# Patient Record
Sex: FEMALE | Race: WHITE | Employment: PART TIME | URBAN - METROPOLITAN AREA
[De-identification: names, ages, dates, MRNs, and addresses within clinical notes are randomized per-mention and may not be internally consistent; named-entity substitution may affect disease eponyms.]

---

## 2018-04-04 ENCOUNTER — HOSPITAL ENCOUNTER (OUTPATIENT)
Dept: OTHER | Age: 28
Discharge: HOME | End: 2018-04-04
Payer: OTHER GOVERNMENT

## 2018-04-04 ENCOUNTER — HOSPITAL ENCOUNTER (OUTPATIENT)
Dept: PREADMISSION TESTING | Age: 28
Discharge: HOME | End: 2018-04-04
Payer: OTHER GOVERNMENT

## 2018-04-04 ENCOUNTER — HOSPITAL ENCOUNTER (OUTPATIENT)
Dept: LAB | Age: 28
Discharge: HOME | End: 2018-04-04
Payer: OTHER GOVERNMENT

## 2018-04-04 DIAGNOSIS — Z01.818 PRE-OP TESTING: ICD-10-CM

## 2018-04-04 DIAGNOSIS — Z01.811 PRE-OP CHEST EXAM: ICD-10-CM

## 2018-04-04 LAB
ALBUMIN SERPL-MCNC: 3.6 G/DL (ref 3.4–5)
ALBUMIN/GLOB SERPL: 1.1 {RATIO} (ref 0.8–1.7)
ALP SERPL-CCNC: 112 U/L (ref 45–117)
ALT SERPL-CCNC: 18 U/L (ref 13–56)
ANION GAP SERPL CALC-SCNC: 8 MMOL/L (ref 3–18)
APPEARANCE UR: CLEAR
APTT PPP: 28.7 SEC (ref 23–36.4)
AST SERPL-CCNC: 16 U/L (ref 15–37)
BACTERIA URNS QL MICRO: NEGATIVE /HPF
BASOPHILS # BLD: 0 K/UL (ref 0–0.06)
BASOPHILS NFR BLD: 0 % (ref 0–2)
BILIRUB SERPL-MCNC: 0.2 MG/DL (ref 0.2–1)
BILIRUB UR QL: NEGATIVE
BUN SERPL-MCNC: 7 MG/DL (ref 7–18)
BUN/CREAT SERPL: 8 (ref 12–20)
CALCIUM SERPL-MCNC: 8.7 MG/DL (ref 8.5–10.1)
CHLORIDE SERPL-SCNC: 107 MMOL/L (ref 100–108)
CO2 SERPL-SCNC: 26 MMOL/L (ref 21–32)
COLOR UR: YELLOW
CREAT SERPL-MCNC: 0.85 MG/DL (ref 0.6–1.3)
DIFFERENTIAL METHOD BLD: ABNORMAL
EOSINOPHIL # BLD: 0.3 K/UL (ref 0–0.4)
EOSINOPHIL NFR BLD: 3 % (ref 0–5)
EPITH CASTS URNS QL MICRO: NORMAL /LPF (ref 0–5)
ERYTHROCYTE [DISTWIDTH] IN BLOOD BY AUTOMATED COUNT: 12.3 % (ref 11.6–14.5)
GLOBULIN SER CALC-MCNC: 3.4 G/DL (ref 2–4)
GLUCOSE SERPL-MCNC: 91 MG/DL (ref 74–99)
GLUCOSE UR STRIP.AUTO-MCNC: NEGATIVE MG/DL
HCT VFR BLD AUTO: 40.1 % (ref 35–45)
HGB BLD-MCNC: 13.8 G/DL (ref 12–16)
HGB UR QL STRIP: NEGATIVE
INR PPP: 0.9 (ref 0.8–1.2)
KETONES UR QL STRIP.AUTO: NEGATIVE MG/DL
LEUKOCYTE ESTERASE UR QL STRIP.AUTO: ABNORMAL
LYMPHOCYTES # BLD: 1.8 K/UL (ref 0.9–3.6)
LYMPHOCYTES NFR BLD: 18 % (ref 21–52)
MCH RBC QN AUTO: 30.5 PG (ref 24–34)
MCHC RBC AUTO-ENTMCNC: 34.4 G/DL (ref 31–37)
MCV RBC AUTO: 88.5 FL (ref 74–97)
MONOCYTES # BLD: 0.6 K/UL (ref 0.05–1.2)
MONOCYTES NFR BLD: 6 % (ref 3–10)
NEUTS SEG # BLD: 7.1 K/UL (ref 1.8–8)
NEUTS SEG NFR BLD: 73 % (ref 40–73)
NITRITE UR QL STRIP.AUTO: NEGATIVE
PH UR STRIP: 7.5 [PH] (ref 5–8)
PLATELET # BLD AUTO: 229 K/UL (ref 135–420)
PMV BLD AUTO: 8.9 FL (ref 9.2–11.8)
POTASSIUM SERPL-SCNC: 3.9 MMOL/L (ref 3.5–5.5)
PROT SERPL-MCNC: 7 G/DL (ref 6.4–8.2)
PROT UR STRIP-MCNC: NEGATIVE MG/DL
PROTHROMBIN TIME: 12.1 SEC (ref 11.5–15.2)
RBC # BLD AUTO: 4.53 M/UL (ref 4.2–5.3)
RBC #/AREA URNS HPF: 0 /HPF (ref 0–5)
SODIUM SERPL-SCNC: 141 MMOL/L (ref 136–145)
SP GR UR REFRACTOMETRY: 1.02 (ref 1–1.03)
UROBILINOGEN UR QL STRIP.AUTO: 1 EU/DL (ref 0.2–1)
WBC # BLD AUTO: 9.8 K/UL (ref 4.6–13.2)
WBC URNS QL MICRO: NORMAL /HPF (ref 0–4)

## 2018-04-04 PROCEDURE — 87086 URINE CULTURE/COLONY COUNT: CPT | Performed by: NEUROLOGICAL SURGERY

## 2018-04-04 PROCEDURE — 71046 X-RAY EXAM CHEST 2 VIEWS: CPT

## 2018-04-04 PROCEDURE — 81001 URINALYSIS AUTO W/SCOPE: CPT | Performed by: NEUROLOGICAL SURGERY

## 2018-04-04 PROCEDURE — 36415 COLL VENOUS BLD VENIPUNCTURE: CPT | Performed by: NEUROLOGICAL SURGERY

## 2018-04-04 PROCEDURE — 85610 PROTHROMBIN TIME: CPT | Performed by: NEUROLOGICAL SURGERY

## 2018-04-04 PROCEDURE — 85730 THROMBOPLASTIN TIME PARTIAL: CPT | Performed by: NEUROLOGICAL SURGERY

## 2018-04-04 PROCEDURE — 80053 COMPREHEN METABOLIC PANEL: CPT | Performed by: NEUROLOGICAL SURGERY

## 2018-04-04 PROCEDURE — 85025 COMPLETE CBC W/AUTO DIFF WBC: CPT | Performed by: NEUROLOGICAL SURGERY

## 2018-04-04 PROCEDURE — 93005 ELECTROCARDIOGRAM TRACING: CPT

## 2018-04-04 RX ORDER — BUPROPION HYDROCHLORIDE 150 MG/1
150 TABLET, EXTENDED RELEASE ORAL 2 TIMES DAILY
COMMUNITY

## 2018-04-04 RX ORDER — ETONOGESTREL AND ETHINYL ESTRADIOL 11.7; 2.7 MG/1; MG/1
INSERT, EXTENDED RELEASE VAGINAL
COMMUNITY

## 2018-04-04 NOTE — PERIOP NOTES
PAT - SURGICAL PRE-ADMISSION INSTRUCTIONS    NAME:  Natalio Roblero                                                          TODAY'S DATE:  4/4/2018    SURGERY DATE:  4/12/2018                                  SURGERY ARRIVAL TIME:   1130    1. Do NOT eat or drink anything, including candy or gum, after MIDNIGHT on 4/11/18 , unless you have specific instructions from your Surgeon or Anesthesia Provider to do so. 2. No smoking on the day of surgery. 3. No alcohol 24 hours prior to the day of surgery. 4. No recreational drugs for one week prior to the day of surgery. 5. Leave all valuables, including money/purse, at home. 6. Remove all jewelry, nail polish, makeup (including mascara); no lotions, powders, deodorant, or perfume/cologne/after shave. 7. Glasses/Contact lenses and Dentures may be worn to the hospital.  They will be removed prior to surgery. 8. Call your doctor if symptoms of a cold or illness develop within 24 ours prior to surgery. 9. AN ADULT MUST DRIVE YOU HOME AFTER OUTPATIENT SURGERY. 10. If you are having an OUTPATIENT procedure, please make arrangements for a responsible adult to be with you for 24 hours after your surgery. 11. If you are admitted to the hospital, you will be assigned to a bed after surgery is complete. Normally a family member will not be able to see you until you are in your assigned bed. 15. Family is encouraged to accompany you to the hospital.  We ask visitors in the treatment area to be limited to ONE person at a time to ensure patient privacy. EXCEPTIONS WILL BE MADE AS NEEDED. 15. Children under 12 are discouraged from entering the treatment area and need to be supervised by an adult when in the waiting room. Special Instructions: Take these medications the morning of surgery with a sip of water: Wellbutrin    Patient Prep:    use CHG solution    These surgical instructions were reviewed with patient during the PAT phone call.   A printed copy of the instructions was provided to patient. Directions: On the morning of surgery, please go to the [facility]. Enter the building from the [facility] entrance, 1st floor (next to the Emergency Room entrance). Take the elevator to the 2nd floor. Sign in at the Registration Desk.     If you have any questions and/or concerns, please do not hesitate to call:  (Prior to the day of surgery)  hospitals unit:  [phone]  (Day of surgery)  [facility] unit:  [phone]

## 2018-04-05 LAB
ATRIAL RATE: 74 BPM
CALCULATED P AXIS, ECG09: 60 DEGREES
CALCULATED R AXIS, ECG10: 55 DEGREES
CALCULATED T AXIS, ECG11: 57 DEGREES
DIAGNOSIS, 93000: NORMAL
P-R INTERVAL, ECG05: 158 MS
Q-T INTERVAL, ECG07: 384 MS
QRS DURATION, ECG06: 92 MS
QTC CALCULATION (BEZET), ECG08: 426 MS
VENTRICULAR RATE, ECG03: 74 BPM

## 2018-04-06 LAB
BACTERIA SPEC CULT: NORMAL
SERVICE CMNT-IMP: NORMAL

## 2018-04-11 ENCOUNTER — ANESTHESIA EVENT (OUTPATIENT)
Dept: SURGERY | Age: 28
End: 2018-04-11
Payer: OTHER GOVERNMENT

## 2018-04-11 ENCOUNTER — HOSPITAL ENCOUNTER (OUTPATIENT)
Dept: PREADMISSION TESTING | Age: 28
Discharge: HOME | End: 2018-04-11
Payer: OTHER GOVERNMENT

## 2018-04-11 DIAGNOSIS — Z01.818 PREOP TESTING: ICD-10-CM

## 2018-04-11 LAB
ABO + RH BLD: NORMAL
BLOOD GROUP ANTIBODIES SERPL: NORMAL
SPECIMEN EXP DATE BLD: NORMAL

## 2018-04-11 PROCEDURE — 86900 BLOOD TYPING SEROLOGIC ABO: CPT | Performed by: NEUROLOGICAL SURGERY

## 2018-04-11 PROCEDURE — 36415 COLL VENOUS BLD VENIPUNCTURE: CPT | Performed by: NEUROLOGICAL SURGERY

## 2018-04-12 ENCOUNTER — APPOINTMENT (OUTPATIENT)
Dept: GENERAL RADIOLOGY | Age: 28
End: 2018-04-12
Attending: NEUROLOGICAL SURGERY
Payer: OTHER GOVERNMENT

## 2018-04-12 ENCOUNTER — ANESTHESIA (OUTPATIENT)
Dept: SURGERY | Age: 28
End: 2018-04-12
Payer: OTHER GOVERNMENT

## 2018-04-12 ENCOUNTER — HOSPITAL ENCOUNTER (OUTPATIENT)
Age: 28
Setting detail: OUTPATIENT SURGERY
Discharge: HOME | End: 2018-04-12
Attending: NEUROLOGICAL SURGERY | Admitting: NEUROLOGICAL SURGERY
Payer: OTHER GOVERNMENT

## 2018-04-12 VITALS
DIASTOLIC BLOOD PRESSURE: 72 MMHG | BODY MASS INDEX: 32.95 KG/M2 | RESPIRATION RATE: 14 BRPM | OXYGEN SATURATION: 100 % | HEIGHT: 69 IN | WEIGHT: 222.44 LBS | TEMPERATURE: 97.1 F | SYSTOLIC BLOOD PRESSURE: 117 MMHG | HEART RATE: 74 BPM

## 2018-04-12 DIAGNOSIS — M51.16 LUMBAR DISC DISEASE WITH RADICULOPATHY: Primary | ICD-10-CM

## 2018-04-12 LAB — HCG UR QL: NEGATIVE

## 2018-04-12 PROCEDURE — 74011250636 HC RX REV CODE- 250/636

## 2018-04-12 PROCEDURE — 76060000033 HC ANESTHESIA 1 TO 1.5 HR: Performed by: NEUROLOGICAL SURGERY

## 2018-04-12 PROCEDURE — 74011250636 HC RX REV CODE- 250/636: Performed by: NURSE ANESTHETIST, CERTIFIED REGISTERED

## 2018-04-12 PROCEDURE — 77030008477 HC STYL SATN SLP COVD -A: Performed by: ANESTHESIOLOGY

## 2018-04-12 PROCEDURE — 77030033827 HC SLV COMPR SCD THGH COVD -B: Performed by: NEUROLOGICAL SURGERY

## 2018-04-12 PROCEDURE — 77030013079 HC BLNKT BAIR HGGR 3M -A: Performed by: ANESTHESIOLOGY

## 2018-04-12 PROCEDURE — 74011000250 HC RX REV CODE- 250: Performed by: NEUROLOGICAL SURGERY

## 2018-04-12 PROCEDURE — C1729 CATH, DRAINAGE: HCPCS | Performed by: NEUROLOGICAL SURGERY

## 2018-04-12 PROCEDURE — 74011250636 HC RX REV CODE- 250/636: Performed by: NEUROLOGICAL SURGERY

## 2018-04-12 PROCEDURE — 72100 X-RAY EXAM L-S SPINE 2/3 VWS: CPT

## 2018-04-12 PROCEDURE — 77030029099 HC BN WAX SSPC -A: Performed by: NEUROLOGICAL SURGERY

## 2018-04-12 PROCEDURE — 77030028990 HC ADH TISS DERMFLX CHMP -B: Performed by: NEUROLOGICAL SURGERY

## 2018-04-12 PROCEDURE — 74011000272 HC RX REV CODE- 272: Performed by: NEUROLOGICAL SURGERY

## 2018-04-12 PROCEDURE — 77030008683 HC TU ET CUF COVD -A: Performed by: ANESTHESIOLOGY

## 2018-04-12 PROCEDURE — 77030003028 HC SUT VCRL J&J -A: Performed by: NEUROLOGICAL SURGERY

## 2018-04-12 PROCEDURE — 77030018842 HC SOL IRR SOD CL 9% BAXT -A: Performed by: NEUROLOGICAL SURGERY

## 2018-04-12 PROCEDURE — 77030018673: Performed by: NEUROLOGICAL SURGERY

## 2018-04-12 PROCEDURE — 77030012602 HC SPNG PTTY NEUR J&J -B: Performed by: NEUROLOGICAL SURGERY

## 2018-04-12 PROCEDURE — 76010000149 HC OR TIME 1 TO 1.5 HR: Performed by: NEUROLOGICAL SURGERY

## 2018-04-12 PROCEDURE — 74011250637 HC RX REV CODE- 250/637: Performed by: NURSE ANESTHETIST, CERTIFIED REGISTERED

## 2018-04-12 PROCEDURE — 74011000250 HC RX REV CODE- 250

## 2018-04-12 PROCEDURE — 76210000026 HC REC RM PH II 1 TO 1.5 HR: Performed by: NEUROLOGICAL SURGERY

## 2018-04-12 PROCEDURE — 77030011640 HC PAD GRND REM COVD -A: Performed by: NEUROLOGICAL SURGERY

## 2018-04-12 PROCEDURE — 77030003029 HC SUT VCRL J&J -B: Performed by: NEUROLOGICAL SURGERY

## 2018-04-12 PROCEDURE — 81025 URINE PREGNANCY TEST: CPT

## 2018-04-12 PROCEDURE — 76210000006 HC OR PH I REC 0.5 TO 1 HR: Performed by: NEUROLOGICAL SURGERY

## 2018-04-12 PROCEDURE — 77030011264 HC ELECTRD BLD EXT COVD -A: Performed by: NEUROLOGICAL SURGERY

## 2018-04-12 RX ORDER — DEXAMETHASONE SODIUM PHOSPHATE 4 MG/ML
INJECTION, SOLUTION INTRA-ARTICULAR; INTRALESIONAL; INTRAMUSCULAR; INTRAVENOUS; SOFT TISSUE AS NEEDED
Status: DISCONTINUED | OUTPATIENT
Start: 2018-04-12 | End: 2018-04-12 | Stop reason: HOSPADM

## 2018-04-12 RX ORDER — GLYCOPYRROLATE 0.2 MG/ML
INJECTION INTRAMUSCULAR; INTRAVENOUS AS NEEDED
Status: DISCONTINUED | OUTPATIENT
Start: 2018-04-12 | End: 2018-04-12 | Stop reason: HOSPADM

## 2018-04-12 RX ORDER — ONDANSETRON 2 MG/ML
4 INJECTION INTRAMUSCULAR; INTRAVENOUS
Status: COMPLETED | OUTPATIENT
Start: 2018-04-12 | End: 2018-04-12

## 2018-04-12 RX ORDER — METHYLPREDNISOLONE 4 MG/1
TABLET ORAL
Qty: 1 DOSE PACK | Refills: 0 | Status: SHIPPED | OUTPATIENT
Start: 2018-04-12 | End: 2018-09-28

## 2018-04-12 RX ORDER — MIDAZOLAM HYDROCHLORIDE 1 MG/ML
INJECTION, SOLUTION INTRAMUSCULAR; INTRAVENOUS AS NEEDED
Status: DISCONTINUED | OUTPATIENT
Start: 2018-04-12 | End: 2018-04-12 | Stop reason: HOSPADM

## 2018-04-12 RX ORDER — SODIUM CHLORIDE, SODIUM LACTATE, POTASSIUM CHLORIDE, CALCIUM CHLORIDE 600; 310; 30; 20 MG/100ML; MG/100ML; MG/100ML; MG/100ML
75 INJECTION, SOLUTION INTRAVENOUS CONTINUOUS
Status: DISCONTINUED | OUTPATIENT
Start: 2018-04-12 | End: 2018-04-12 | Stop reason: HOSPADM

## 2018-04-12 RX ORDER — NEOSTIGMINE METHYLSULFATE 5 MG/5 ML
SYRINGE (ML) INTRAVENOUS AS NEEDED
Status: DISCONTINUED | OUTPATIENT
Start: 2018-04-12 | End: 2018-04-12 | Stop reason: HOSPADM

## 2018-04-12 RX ORDER — ONDANSETRON 2 MG/ML
INJECTION INTRAMUSCULAR; INTRAVENOUS AS NEEDED
Status: DISCONTINUED | OUTPATIENT
Start: 2018-04-12 | End: 2018-04-12 | Stop reason: HOSPADM

## 2018-04-12 RX ORDER — LIDOCAINE HYDROCHLORIDE AND EPINEPHRINE 10; 10 MG/ML; UG/ML
INJECTION, SOLUTION INFILTRATION; PERINEURAL AS NEEDED
Status: DISCONTINUED | OUTPATIENT
Start: 2018-04-12 | End: 2018-04-12 | Stop reason: HOSPADM

## 2018-04-12 RX ORDER — VECURONIUM BROMIDE FOR INJECTION 1 MG/ML
INJECTION, POWDER, LYOPHILIZED, FOR SOLUTION INTRAVENOUS AS NEEDED
Status: DISCONTINUED | OUTPATIENT
Start: 2018-04-12 | End: 2018-04-12 | Stop reason: HOSPADM

## 2018-04-12 RX ORDER — LIDOCAINE HYDROCHLORIDE AND EPINEPHRINE 10; 10 MG/ML; UG/ML
30 INJECTION, SOLUTION INFILTRATION; PERINEURAL ONCE
Status: DISCONTINUED | OUTPATIENT
Start: 2018-04-12 | End: 2018-04-12 | Stop reason: HOSPADM

## 2018-04-12 RX ORDER — SODIUM CHLORIDE 0.9 % (FLUSH) 0.9 %
5-10 SYRINGE (ML) INJECTION EVERY 8 HOURS
Status: DISCONTINUED | OUTPATIENT
Start: 2018-04-12 | End: 2018-04-12 | Stop reason: HOSPADM

## 2018-04-12 RX ORDER — SODIUM CHLORIDE 0.9 % (FLUSH) 0.9 %
5-10 SYRINGE (ML) INJECTION AS NEEDED
Status: DISCONTINUED | OUTPATIENT
Start: 2018-04-12 | End: 2018-04-12 | Stop reason: HOSPADM

## 2018-04-12 RX ORDER — OXYCODONE AND ACETAMINOPHEN 5; 325 MG/1; MG/1
1-2 TABLET ORAL
Qty: 40 TAB | Refills: 0 | Status: SHIPPED | OUTPATIENT
Start: 2018-04-12 | End: 2018-09-28

## 2018-04-12 RX ORDER — LIDOCAINE HYDROCHLORIDE 20 MG/ML
INJECTION, SOLUTION EPIDURAL; INFILTRATION; INTRACAUDAL; PERINEURAL AS NEEDED
Status: DISCONTINUED | OUTPATIENT
Start: 2018-04-12 | End: 2018-04-12 | Stop reason: HOSPADM

## 2018-04-12 RX ORDER — PROPOFOL 10 MG/ML
INJECTION, EMULSION INTRAVENOUS AS NEEDED
Status: DISCONTINUED | OUTPATIENT
Start: 2018-04-12 | End: 2018-04-12 | Stop reason: HOSPADM

## 2018-04-12 RX ORDER — MORPHINE SULFATE 4 MG/ML
INJECTION, SOLUTION INTRAMUSCULAR; INTRAVENOUS AS NEEDED
Status: DISCONTINUED | OUTPATIENT
Start: 2018-04-12 | End: 2018-04-12 | Stop reason: HOSPADM

## 2018-04-12 RX ORDER — FAMOTIDINE 20 MG/1
20 TABLET, FILM COATED ORAL ONCE
Status: COMPLETED | OUTPATIENT
Start: 2018-04-13 | End: 2018-04-12

## 2018-04-12 RX ORDER — CEFAZOLIN SODIUM 2 G/50ML
2 SOLUTION INTRAVENOUS
Status: COMPLETED | OUTPATIENT
Start: 2018-04-12 | End: 2018-04-12

## 2018-04-12 RX ORDER — FENTANYL CITRATE 50 UG/ML
INJECTION, SOLUTION INTRAMUSCULAR; INTRAVENOUS AS NEEDED
Status: DISCONTINUED | OUTPATIENT
Start: 2018-04-12 | End: 2018-04-12 | Stop reason: HOSPADM

## 2018-04-12 RX ORDER — FAMOTIDINE 20 MG/1
TABLET, FILM COATED ORAL
Status: DISCONTINUED
Start: 2018-04-12 | End: 2018-04-12 | Stop reason: HOSPADM

## 2018-04-12 RX ORDER — FENTANYL CITRATE 50 UG/ML
50 INJECTION, SOLUTION INTRAMUSCULAR; INTRAVENOUS
Status: DISCONTINUED | OUTPATIENT
Start: 2018-04-12 | End: 2018-04-12 | Stop reason: HOSPADM

## 2018-04-12 RX ORDER — SODIUM CHLORIDE, SODIUM LACTATE, POTASSIUM CHLORIDE, CALCIUM CHLORIDE 600; 310; 30; 20 MG/100ML; MG/100ML; MG/100ML; MG/100ML
50 INJECTION, SOLUTION INTRAVENOUS CONTINUOUS
Status: DISCONTINUED | OUTPATIENT
Start: 2018-04-13 | End: 2018-04-12 | Stop reason: HOSPADM

## 2018-04-12 RX ADMIN — Medication 3 MG: at 18:14

## 2018-04-12 RX ADMIN — SODIUM CHLORIDE, SODIUM LACTATE, POTASSIUM CHLORIDE, AND CALCIUM CHLORIDE 50 ML/HR: 600; 310; 30; 20 INJECTION, SOLUTION INTRAVENOUS at 13:02

## 2018-04-12 RX ADMIN — LIDOCAINE HYDROCHLORIDE 50 MG: 20 INJECTION, SOLUTION EPIDURAL; INFILTRATION; INTRACAUDAL; PERINEURAL at 17:08

## 2018-04-12 RX ADMIN — ONDANSETRON 4 MG: 2 INJECTION INTRAMUSCULAR; INTRAVENOUS at 17:23

## 2018-04-12 RX ADMIN — SODIUM CHLORIDE, SODIUM LACTATE, POTASSIUM CHLORIDE, AND CALCIUM CHLORIDE: 600; 310; 30; 20 INJECTION, SOLUTION INTRAVENOUS at 17:31

## 2018-04-12 RX ADMIN — MIDAZOLAM HYDROCHLORIDE 2 MG: 1 INJECTION, SOLUTION INTRAMUSCULAR; INTRAVENOUS at 17:03

## 2018-04-12 RX ADMIN — FAMOTIDINE 20 MG: 20 TABLET, FILM COATED ORAL at 13:02

## 2018-04-12 RX ADMIN — PROPOFOL 200 MG: 10 INJECTION, EMULSION INTRAVENOUS at 17:08

## 2018-04-12 RX ADMIN — GLYCOPYRROLATE 0.4 MG: 0.2 INJECTION INTRAMUSCULAR; INTRAVENOUS at 18:14

## 2018-04-12 RX ADMIN — MORPHINE SULFATE 7 MG: 4 INJECTION, SOLUTION INTRAMUSCULAR; INTRAVENOUS at 18:22

## 2018-04-12 RX ADMIN — CEFAZOLIN SODIUM 2 G: 2 SOLUTION INTRAVENOUS at 17:23

## 2018-04-12 RX ADMIN — PROPOFOL 50 MG: 10 INJECTION, EMULSION INTRAVENOUS at 17:13

## 2018-04-12 RX ADMIN — FENTANYL CITRATE 100 MCG: 50 INJECTION, SOLUTION INTRAMUSCULAR; INTRAVENOUS at 17:03

## 2018-04-12 RX ADMIN — MORPHINE SULFATE 3 MG: 4 INJECTION, SOLUTION INTRAMUSCULAR; INTRAVENOUS at 17:17

## 2018-04-12 RX ADMIN — DEXAMETHASONE SODIUM PHOSPHATE 10 MG: 4 INJECTION, SOLUTION INTRA-ARTICULAR; INTRALESIONAL; INTRAMUSCULAR; INTRAVENOUS; SOFT TISSUE at 17:18

## 2018-04-12 RX ADMIN — VECURONIUM BROMIDE FOR INJECTION 1 MG: 1 INJECTION, POWDER, LYOPHILIZED, FOR SOLUTION INTRAVENOUS at 17:55

## 2018-04-12 RX ADMIN — VECURONIUM BROMIDE FOR INJECTION 4 MG: 1 INJECTION, POWDER, LYOPHILIZED, FOR SOLUTION INTRAVENOUS at 17:08

## 2018-04-12 RX ADMIN — ONDANSETRON 4 MG: 2 INJECTION INTRAMUSCULAR; INTRAVENOUS at 19:24

## 2018-04-12 NOTE — ANESTHESIA POSTPROCEDURE EVALUATION
Post-Anesthesia Evaluation and Assessment    Patient: Bessy Amor MRN: [id number]  SSN: [id number]    YOB: 1990  Age: 32 y.o. Sex: female       Cardiovascular Function/Vital Signs  Visit Vitals    /57    Pulse 86    Temp 36.2 °C (97.1 °F)    Resp 15    Ht 5' 9\" (1.753 m)    Wt 100.9 kg (222 lb 7 oz)    SpO2 100%    BMI 32.85 kg/m2       Patient is status post general anesthesia for Procedure(s): MINIMALLY INVASIVE right lumbar 5 sacral 1 far lateral microdiscectomy. Nausea/Vomiting: None    Postoperative hydration reviewed and adequate. Pain:  Pain Scale 1: Visual (04/12/18 1832)  Pain Intensity 1: 0 (04/12/18 1832)   Managed    Neurological Status:   Neuro (WDL): Within Defined Limits (04/12/18 1249)   At baseline    Mental Status and Level of Consciousness: Arousable    Pulmonary Status:   O2 Device: Room air (04/12/18 1837)   Adequate oxygenation and airway patent    Complications related to anesthesia: None    Post-anesthesia assessment completed.  No concerns    Signed By: Mouniak Mann MD     April 12, 2018

## 2018-04-12 NOTE — PERIOP NOTES
1832  Patient received in PACU and connected to monitors. Vital signs stable. RN at bedside. Will continue to monitor. 1910  Spoke to patient's family via phone in waiting area re status update and plan of care.

## 2018-04-12 NOTE — IP AVS SNAPSHOT
[address] [address] Ynes Farrell  
[phone] Patient: Lis Shirley MRN: [id number] VFI:[id number] About your hospitalization You were admitted on:  April 12, 2018 You last received care in the:  [facility] PHASE 2 RECOVERY You were discharged on:  April 12, 2018 Why you were hospitalized Your primary diagnosis was:  Not on File Follow-up Information Follow up With Details Comments Contact Info Casper Gonzalez MD   [address] [facility] [address] [id number] [phone] Discharge Orders None A check cezar indicates which time of day the medication should be taken. My Medications START taking these medications Instructions Each Dose to Equal  
 Morning Noon Evening Bedtime  
 methylPREDNISolone 4 mg tablet Commonly known as:  Kacy Pimple Your last dose was: Your next dose is: As directed. oxyCODONE-acetaminophen 5-325 mg per tablet Commonly known as:  PERCOCET Your last dose was: Your next dose is: Take 1-2 Tabs by mouth every six (6) hours as needed for Pain. Max Daily Amount: 8 Tabs. 1-2 Tab CONTINUE taking these medications Instructions Each Dose to Equal  
 Morning Noon Evening Bedtime NUVARING 0.12-0.015 mg/24 hr vaginal ring Generic drug:  ethinyl estradiol-etonogestrel Your last dose was: Your next dose is:    
   
   
 by Intravaginal route. WELLBUTRIN  mg SR tablet Generic drug:  buPROPion SR Your last dose was: Your next dose is: Take 150 mg by mouth two (2) times a day. 150 mg Where to Get Your Medications Information on where to get these meds will be given to you by the nurse or doctor. ! Ask your nurse or doctor about these medications methylPREDNISolone 4 mg tablet  
 oxyCODONE-acetaminophen 5-325 mg per tablet Opioid Education Prescription Opioids: What You Need to Know: 
 
 
[facility] Spine Discharge Instructions *YOU MUST AVOID SMOKING OR BEING AROUND ANYONE WHO SMOKES. AVOID ALL PRODUCTS THAT CONTAIN NICOTINE. DO NOT TAKE IBUPROFEN OR ANTI--INFLAMMATORIES, AS THESE MAY ALTER THE HEALING OF THE FUSION. ACTIVITIES : 
*The first week after surgery 1. You may be up and walking about the house. 2.  Activities around the house, such as washing dishes, fixing light meals, and your own personal care are fine. 3.  Avoid strenuous activities, such as vacuuming, lifting laundry or grocery bags. 4.  Do not lift anything heavier than 1 gallon of milk (or about 5-8 pounds). 5.  Do not bend over to  items from the ground level until 3 months post-op. *Week 2 and beyond 1.  You may gradually increase your activities, but still avoid heavy lifting, pushing/pulling. 2. [place] is the best way to rebuild strength and stamina. Start SLOWLY and gradually increase the distance a little every week. 3.  Walk at a pace that avoids fatigue or severe pain. Do not try to walk several blocks the first day! As you increase the distance, you may feel tired. If so, stop and rest.  
4.  You should be able to walk several blocks by your first clinic visit. 5.  Follow-up in the office in 4 weeks. (If you have staples at your incision site, you will need to be seen in 2 weeks.) BATHING and INCISION CARE: 
1. The incision may be tender to touch or feel numb: this is normal.  
2.  Keep the incision clean and dry.  No tub baths.  You may shower 3 days after you surgery unless instructed otherwise. 3.  Do not apply any lotions, ointments or oils on the incision. 4.  If you notice any excessive swelling, redness, or persistent drainage around the incision, notify the office immediately. DRIVIN.  You should not drive until after your follow-up appointment. 2.  You can be in a vehicle for short distances, but if you travel any long distance, please stop about every 30 minutes and walk/stretch. 3.  You should NEVER drive while taking narcotic medication. RETURN TO WORK : 
1. The decision to return to work will be determined on an individual basis. 2.  Many people who have a strenuous job (construction, heavy labor, etc) may need to be off work for up to 12 weeks. 3.  If you need a work note, please let us know as soon as possible, and not the same day you are planning to return to work. NUTRITION : 
1.  Good nutrition is an essential part of healing. 2.  You should eat a balanced diet each day, including fruits, vegetables, dairy products and protein. 3.  Remember to drink plenty of water. 4.  If you have not had a bowel movement within 3 days of surgery, you will need to use a laxative or suppository that can be obtained over-the-counter at your local pharmacy. MEDICATIONS - 
1. You may resume the medications you were taking before surgery, with the general exception of (or DO NOT TAKE) non-steroidal anti-inflammatory medications, such as Motrin, Aleve, Advil Naprosyn, Ibuprofen or aspirin. 2.  You will receive a prescription for pain medication at discharge from the hospital. The pain medication works best if taken before the pain becomes severe. 3.  To reduce stomach upset, always take the medication with food. 4.  Begin to wean yourself off the pain medication during the second week after discharge. 5.  If you need a refill, please call the office during working hours at least 2 days before your prescription runs out.  Do not wait until your bottle is empty to call for a refill. 6.  DO NOT drive if you are taking narcotic pain medications. HOME HEALTH CARE: (Per individual case if indicated) 1.   A home health care service has been set-up for you to help assist you once you leave the hospital. 
2. Lion Salas will contact you either before you leave the hospital or within 24 hours once you have been discharged home. 3. A nurse will assist you with your dressing changes and a Physical Therapist with help you with your therapy needs. CALL THE OFFICE: If you have severe pain unrelieved by the medications, new numbness or tingling in your legs; If you have a fever of 101.0°F or greater; If you notice excessive swelling, redness, or persistent drainage from the incision or IV site Call for any questions or concerns Office [phone] DISCHARGE SUMMARY from Nurse PATIENT INSTRUCTIONS: 
 
After general anesthesia or intravenous sedation, for 24 hours or while taking prescription Narcotics: · Limit your activities · Do not drive and operate hazardous machinery · Do not make important personal or business decisions · Do  not drink alcoholic beverages · If you have not urinated within 8 hours after discharge, please contact your surgeon on call. Report the following to your surgeon: 
· Excessive pain, swelling, redness or odor of or around the surgical area · Temperature over 100.5 · Nausea and vomiting lasting longer than 4 hours or if unable to take medications · Any signs of decreased circulation or nerve impairment to extremity: change in color, persistent  numbness, tingling, coldness or increase pain · Any questions What to do at Home: No smoking/ No tobacco products/ Avoid exposure to second hand smoke Surgeon General's Warning:  Quitting smoking now greatly reduces serious risk to your health. Obesity, smoking, and sedentary lifestyle greatly increases your risk for illness A healthy diet, regular physical exercise & weight monitoring are important for maintaining a healthy lifestyle You may be retaining fluid if you have a history of heart failure or if you experience any of the following symptoms:  Weight gain of 3 pounds or more overnight or 5 pounds in a week, increased swelling in our hands or feet or shortness of breath while lying flat in bed. Please call your doctor as soon as you notice any of these symptoms; do not wait until your next office visit. Recognize signs and symptoms of STROKE: 
 
F-face looks uneven A-arms unable to move or move unevenly S-speech slurred or non-existent T-time-call 911 as soon as signs and symptoms begin-DO NOT go Back to bed or wait to see if you get better-TIME IS BRAIN. Warning Signs of HEART ATTACK Call 911 if you have these symptoms: 
? Chest discomfort. Most heart attacks involve discomfort in the center of the chest that lasts more than a few minutes, or that goes away and comes back. It can feel like uncomfortable pressure, squeezing, fullness, or pain. ? Discomfort in other areas of the upper body. Symptoms can include pain or discomfort in one or both arms, the back, neck, jaw, or stomach. ? Shortness of breath with or without chest discomfort. ? Other signs may include breaking out in a cold sweat, nausea, or lightheadedness. Don't wait more than five minutes to call [address]! Fast action can save your life. Calling 911 is almost always the fastest way to get lifesaving treatment. Emergency Medical Services staff can begin treatment when they arrive  up to an hour sooner than if someone gets to the hospital by car. The discharge information has been reviewed with the patient. The patient verbalized understanding. Discharge medications reviewed with the patient and appropriate educational materials and side effects teaching were provided. Patient armband removed and given to patient to take home. Patient was informed of the privacy risks if armband lost or stolen Introducing [facility]! Yelena Hicks introduces Bravofly patient portal. Now you can access parts of your medical record, email your doctor's office, and request medication refills online. 1. In your internet browser, go to [email or web address] 2. Click on the First Time User? Click Here link in the Sign In box. You will see the New Member Sign Up page. 3. Enter your Bravofly Access Code exactly as it appears below. You will not need to use this code after youve completed the sign-up process. If you do not sign up before the expiration date, you must request a new code. · Bravofly Access Code: [email or web address] Expires: 7/10/2018  5:20 PM 
 
4. Enter the last four digits of your Social Security Number (xxxx) and Date of Birth (mm/dd/yyyy) as indicated and click Submit. You will be taken to the next sign-up page. 5. Create a Bravofly ID. This will be your Bravofly login ID and cannot be changed, so think of one that is secure and easy to remember. 6. Create a Bravofly password. You can change your password at any time. 7. Enter your Password Reset Question and Answer. This can be used at a later time if you forget your password. 8. Enter your e-mail address. You will receive e-mail notification when new information is available in [address] 9. Click Sign Up. You can now view and download portions of your medical record. 10. Click the Download Summary menu link to download a portable copy of your medical information. If you have questions, please visit the Frequently Asked Questions section of the Brav[employer]ly website. Remember, Bravofly is NOT to be used for urgent needs. For medical emergencies, dial 911. Now available from your iPhone and Android! Introducing Jean Carlos Barnes As a [facility] patient, I wanted to make you aware of our electronic visit tool called Jean Carlos Jeanshivanijayashree. [facility] 24/7 allows you to connect within minutes with a medical provider 24 hours a day, seven days a week via a mobile device or tablet or logging into a secure website from your computer. You can access Jean Carlos Jeanshivanifin from anywhere in the [place]. A virtual visit might be right for you when you have a simple condition and feel like you just dont want to get out of bed, or cant get away from work for an appointment, when your regular [facility] provider is not available (evenings, weekends or holidays), or when youre out of town and need minor care. Electronic visits cost only $49 and if the [facility] 24/7 provider determines a prescription is needed to treat your condition, one can be electronically transmitted to a nearby pharmacy*. Please take a moment to enroll today if you have not already done so. The enrollment process is free and takes just a few minutes. To enroll, please download the [employer] 24/7 loi to your tablet or phone, or visit [email or web address] to enroll on your computer. And, as an [address] [place] [address] patient with a [employer] account, the results of your visits will be scanned into your electronic medical record and your primary care provider will be able to view the scanned results. We urge you to continue to see your regular [facility] provider for your ongoing medical care. And while your primary care provider may not be the one available when you seek a Jean Carlos Jeankiley virtual visit, the peace of mind you get from getting a real diagnosis real time can be priceless. For more information on Jean Carlos Jeankiley, view our Frequently Asked Questions (FAQs) at [email or web address]. Sincerely, 
 
Tiff Cline MD 
Chief Medical Officer Richard8 Bridgett Driver *:  certain medications cannot be prescribed via Jean Carlos Barnes Unresulted Labs-Please follow up with your PCP about these lab tests Order Current Status NC XR TECHNOLOGIST SERVICE In process XR SPINE LUMB 2 OR 3 V In process Providers Seen During Your Hospitalization Provider Specialty Primary office phone Lord Closs, MD Neurosurgery [phone] Your Primary Care Physician (PCP) Primary Care Physician Office Phone Office Fax Tricia Cochran [phone] You are allergic to the following No active allergies Recent Documentation Height Weight BMI OB Status Smoking Status 1.753 m 100.9 kg 32.85 kg/m2 Having regular periods Never Smoker Emergency Contacts Name Discharge Info Relation Home Work Mobile DailyMick DISCHARGE CAREGIVER [3] Spouse [3]   [phone] Patient Belongings The following personal items are in your possession at time of discharge: 
  Dental Appliances: None  Visual Aid: None      Home Medications: None   Jewelry: None  Clothing: Pants, Footwear, Undergarments, Shirt    Other Valuables: [employer] Please provide this summary of care documentation to your next provider. Signatures-by signing, you are acknowledging that this After Visit Summary has been reviewed with you and you have received a copy. Patient Signature:  ____________________________________________________________ Date:  ____________________________________________________________  
  
[facility] Provider Signature:  ____________________________________________________________ Date:  ____________________________________________________________

## 2018-04-12 NOTE — ANESTHESIA PREPROCEDURE EVALUATION
Anesthetic History   No history of anesthetic complications            Review of Systems / Medical History  Patient summary reviewed, nursing notes reviewed and pertinent labs reviewed    Pulmonary  Within defined limits                 Neuro/Psych   Within defined limits           Cardiovascular  Within defined limits                Exercise tolerance: >4 METS     GI/Hepatic/Renal  Within defined limits              Endo/Other        Obesity     Other Findings   Comments: Documentation of current medication  Current medications obtained, documented and obtained? YES      Risk Factors for Postoperative nausea/vomiting:       History of postoperative nausea/vomiting? NO       Female? YES       Motion sickness? NO       Intended opioid administration for postoperative analgesia? NO      Smoking Abstinence:  Current Smoker? NO  Elective Surgery? YES  Seen preoperatively by anesthesiologist or proxy prior to day of surgery? YES  Pt abstained from smoking 24 hours prior to anesthesia?  N/A    Preventive care/screening for High Blood Pressure:  Aged 18 years and older: YES  Screened for high blood pressure: YES  Patients with high blood pressure referred to primary care provider   for BP management: YES                 Physical Exam    Airway  Mallampati: II  TM Distance: 4 - 6 cm  Neck ROM: normal range of motion   Mouth opening: Normal     Cardiovascular  Regular rate and rhythm,  S1 and S2 normal,  no murmur, click, rub, or gallop  Rhythm: regular  Rate: normal         Dental  No notable dental hx       Pulmonary  Breath sounds clear to auscultation               Abdominal  GI exam deferred       Other Findings            Anesthetic Plan    ASA: 2  Anesthesia type: general          Induction: Intravenous  Anesthetic plan and risks discussed with: Patient

## 2018-04-12 NOTE — BRIEF OP NOTE
BRIEF OPERATIVE NOTE    Date of Procedure: 4/12/2018   Preoperative Diagnosis: herniated nucleus pulposus l5-s1 lumbosacral  m51.27  Postoperative Diagnosis: herniated nucleus pulposus l5-s1 lumbosacral  m51.27    Procedure(s):   MINIMALLY INVASIVE right lumbar 5 sacral 1 far lateral microdiscectomy  Surgeon(s) and Role:     * Anay Chappell MD - Primary         Surgical Assistant:     Surgical Staff:  Circ-1: Aayush Ivey RN  Radiology Technician: Frances Alexander  Scrub Tech-1: Hood Hackett  Surg Asst-1: Nurys Park  Event Time In   Incision Start 1733   Incision Close 1815     Anesthesia: General   Estimated Blood Loss: -  Specimens: * No specimens in log *   Findings: -   Complications: -  Implants: * No implants in log *

## 2018-04-12 NOTE — DISCHARGE INSTRUCTIONS
Office:  [phone]     Lumbar Spine Discharge Instructions      *YOU MUST AVOID SMOKING OR BEING AROUND ANYONE WHO SMOKES. AVOID ALL PRODUCTS THAT CONTAIN NICOTINE. DO NOT TAKE IBUPROFEN OR ANTI--INFLAMMATORIES, AS THESE MAY ALTER THE HEALING OF THE FUSION. ACTIVITIES :  *The first week after surgery   1. You may be up and walking about the house. 2.  Activities around the house, such as washing dishes, fixing light meals, and your own personal care are fine. 3.  Avoid strenuous activities, such as vacuuming, lifting laundry or grocery bags. 4.  Do not lift anything heavier than 1 gallon of milk (or about 5-8 pounds). 5.  Do not bend over to  items from the ground level until 3 months post-op. *Week 2 and beyond  1.  You may gradually increase your activities, but still avoid heavy lifting, pushing/pulling. 2. Michae Hedger is the best way to rebuild strength and stamina. Start SLOWLY and gradually increase the distance a little every week. 3.  Walk at a pace that avoids fatigue or severe pain. Do not try to walk several blocks the first day! As you increase the distance, you may feel tired. If so, stop and rest.   4.  You should be able to walk several blocks by your first clinic visit. 5.  Follow-up in the office in 4 weeks. (If you have staples at your incision site, you will need to be seen in 2 weeks.)    BATHING and INCISION CARE:  1. The incision may be tender to touch or feel numb: this is normal.   2.  Keep the incision clean and dry.  No tub baths.  You may shower 3 days after you surgery unless instructed otherwise. 3.  Do not apply any lotions, ointments or oils on the incision. 4.  If you notice any excessive swelling, redness, or persistent drainage around the incision, notify the office immediately. DRIVIN.  You should not drive until after your follow-up appointment.    2.  You can be in a vehicle for short distances, but if you travel any long distance, please stop about every 30 minutes and walk/stretch. 3.  You should NEVER drive while taking narcotic medication. RETURN TO WORK :  1. The decision to return to work will be determined on an individual basis. 2.  Many people who have a strenuous job (construction, heavy labor, etc) may need to be off work for up to 12 weeks. 3.  If you need a work note, please let us know as soon as possible, and not the same day you are planning to return to work. NUTRITION :  1.  Good nutrition is an essential part of healing. 2.  You should eat a balanced diet each day, including fruits, vegetables, dairy products and protein. 3.  Remember to drink plenty of water. 4.  If you have not had a bowel movement within 3 days of surgery, you will need to use a laxative or suppository that can be obtained over-the-counter at your local pharmacy. MEDICATIONS -  1. You may resume the medications you were taking before surgery, with the general exception of (or DO NOT TAKE) non-steroidal anti-inflammatory medications, such as Motrin, Aleve, Advil Naprosyn, Ibuprofen or aspirin. 2.  You will receive a prescription for pain medication at discharge from the hospital. The pain medication works best if taken before the pain becomes severe. 3.  To reduce stomach upset, always take the medication with food. 4.  Begin to wean yourself off the pain medication during the second week after discharge. 5.  If you need a refill, please call the office during working hours at least 2 days before your prescription runs out. Do not wait until your bottle is empty to call for a refill. 6.  DO NOT drive if you are taking narcotic pain medications. HOME HEALTH CARE: (Per individual case if indicated)  1.   A home health care service has been set-up for you to help assist you once you leave the hospital.  2. Corenll Moffett will contact you either before you leave the hospital or within 24 hours once you have been discharged home.   3. A nurse will assist you with your dressing changes and a Physical Therapist with help you with your therapy needs. CALL THE OFFICE:  If you have severe pain unrelieved by the medications, new numbness or tingling in your legs; If you have a fever of 101.0°F or greater; If you notice excessive swelling, redness, or persistent drainage from the incision or IV site   Call for any questions or concerns    Office [phone] from Nurse    PATIENT INSTRUCTIONS:    After general anesthesia or intravenous sedation, for 24 hours or while taking prescription Narcotics:  · Limit your activities  · Do not drive and operate hazardous machinery  · Do not make important personal or business decisions  · Do  not drink alcoholic beverages  · If you have not urinated within 8 hours after discharge, please contact your surgeon on call. Report the following to your surgeon:  · Excessive pain, swelling, redness or odor of or around the surgical area  · Temperature over 100.5  · Nausea and vomiting lasting longer than 4 hours or if unable to take medications  · Any signs of decreased circulation or nerve impairment to extremity: change in color, persistent  numbness, tingling, coldness or increase pain  · Any questions    What to do at Home:  No smoking/ No tobacco products/ Avoid exposure to second hand smoke  Surgeon General's Warning:  Quitting smoking now greatly reduces serious risk to your health. Obesity, smoking, and sedentary lifestyle greatly increases your risk for illness    A healthy diet, regular physical exercise & weight monitoring are important for maintaining a healthy lifestyle    You may be retaining fluid if you have a history of heart failure or if you experience any of the following symptoms:  Weight gain of 3 pounds or more overnight or 5 pounds in a week, increased swelling in our hands or feet or shortness of breath while lying flat in bed.   Please call your doctor as soon as you notice any of these symptoms; do not wait until your next office visit. Recognize signs and symptoms of STROKE:    F-face looks uneven    A-arms unable to move or move unevenly    S-speech slurred or non-existent    T-time-call 911 as soon as signs and symptoms begin-DO NOT go       Back to bed or wait to see if you get better-TIME IS BRAIN. Warning Signs of HEART ATTACK     Call 911 if you have these symptoms:   Chest discomfort. Most heart attacks involve discomfort in the center of the chest that lasts more than a few minutes, or that goes away and comes back. It can feel like uncomfortable pressure, squeezing, fullness, or pain.  Discomfort in other areas of the upper body. Symptoms can include pain or discomfort in one or both arms, the back, neck, jaw, or stomach.  Shortness of breath with or without chest discomfort.  Other signs may include breaking out in a cold sweat, nausea, or lightheadedness. Don't wait more than five minutes to call 911 - MINUTES MATTER! Fast action can save your life. Calling 911 is almost always the fastest way to get lifesaving treatment. Emergency Medical Services staff can begin treatment when they arrive -- up to an hour sooner than if someone gets to the hospital by car. The discharge information has been reviewed with the patient. The patient verbalized understanding. Discharge medications reviewed with the patient and appropriate educational materials and side effects teaching were provided. Patient armband removed and given to patient to take home.   Patient was informed of the privacy risks if armband lost or stolen

## 2018-04-13 NOTE — OP NOTES
[facility]  OPERATIVE REPORT    James Cabezas  MR#: [id number]  : 1990  ACCOUNT #: [de-identified]   DATE OF SERVICE: 2018    PREOPERATIVE DIAGNOSES:  Right L5-S1 far lateral disk herniation and L5 radiculopathy. POSTOPERATIVE DIAGNOSES: Right L5-S1 far lateral disk herniation and L5 radiculopathy. PROCEDURE:    1. Minimally invasive right L5-S1 far lateral approach for microdiskectomy and decompression of the L5 nerve root. 2.  Intraoperative microdissection. SURGEON:  Letha Chamberlain MD     ASSISTANT:      ANESTHESIA:  General orotracheal.    ESTIMATED BLOOD LOSS:  Minimal.    COMPLICATIONS:  None. SPECIMENS:  None. IMPLANTS:  None. BRIEF CLINICAL NOTE:  This is a 58-year-old lady with progressively severe right L5 radicular syndrome in addition to some chronic back pain. She was found to have a far lateral disk herniation and osteophyte complex at L5-S1. These findings were confirmed on today's procedure. She requested to proceed. PROCEDURE IN DETAIL:  After informed consent was given, the patient brought to the operating room and underwent the induction of general orotracheal anesthesia without difficulty. Following this, she was carefully positioned prone upon the operating table, all pressure points carefully padded. Localizing x-ray was done. A vertical incision 2 fingerbreadths to the right of midline, incorporating a line of her tattoo was outlined. We prepped and draped in usual sterile fashion. Local anesthetic was infiltrated. The incision was opened. The [email or web address] minimally invasive access system was used and ultimately a 7 cm x 16 mm tubular retractor was guided and docked on the outside of the right L5-S1 facet joint. This was confirmed fluoroscopically. Microscope was brought into the case. The lateral facet and upper sacral ala were drilled off undercut with the 2 and 3 mm Kerrison rongeur.   The calcified herniated disk was identified. The nerve root was fairly significantly compressed coming out above this. The disk was incised using the downgoing curettes. A diskectomy was carried out of all the loose pieces. It was probably the dorsal foraminotomy and decompression that helped decompress the nerve more. At the end of these maneuvers, the nerve root was completely decompressed out past the L5 pedicle. The wound was then irrigated, closed in layers of vancomycin powder, 2-0 Vicryl and Dermabond on the skin. All sponge and needle counts correct at the end of the case and the patient was extubated in the operating room, taken to recovery in stable condition.       MD IVET Forrest / MIKE  D: 04/12/2018 18:30     T: 04/13/2018 05:30  JOB #: [id number]

## (undated) DEVICE — SOL IRR NACL 0.9% 500ML POUR --

## (undated) DEVICE — COVER SURG EQUIP DRP MICSCP 118IN LEN 43IN W ZEISS OPMI

## (undated) DEVICE — SKIN MARKER,REGULAR TIP WITH RULER AND LABELS: Brand: DEVON

## (undated) DEVICE — SNAP KOVER: Brand: UNBRANDED

## (undated) DEVICE — SYR 10ML CTRL LR LCK NSAF LF --

## (undated) DEVICE — TOWEL: OR BLU 80/CS: Brand: MEDICAL ACTION INDUSTRIES

## (undated) DEVICE — PREP SKN DURAPREP 26ML APPL --

## (undated) DEVICE — REM POLYHESIVE ADULT PATIENT RETURN ELECTRODE: Brand: VALLEYLAB

## (undated) DEVICE — CODMAN® SURGICAL PATTIES 1/2" X 1/2" (1.27CM X 1.27CM): Brand: CODMAN®

## (undated) DEVICE — BLADE ELECTRODE: Brand: EDGE

## (undated) DEVICE — NEEDLE HYPO 22GA L1.5IN BLK S STL HUB POLYPR SHLD REG BVL

## (undated) DEVICE — 3M™ IOBAN™ 2 ANTIMICROBIAL INCISE DRAPE 6650EZ: Brand: IOBAN™ 2

## (undated) DEVICE — CATHETER DRNGE 32FR 4 WNG DISP FOR NEPHSTMY MALECOTS

## (undated) DEVICE — SYRINGE MED 20ML STD CLR PLAS LUERLOCK TIP N CTRL DISP

## (undated) DEVICE — STERILE POLYISOPRENE POWDER-FREE SURGICAL GLOVES: Brand: PROTEXIS

## (undated) DEVICE — SSC BONE WAX: Brand: SSC BONE WAX

## (undated) DEVICE — SPINE PACK DEPAUL: Brand: MEDLINE INDUSTRIES, INC.

## (undated) DEVICE — CATHETER IV 10GA L3IN OD3.3-3.5MM ID2.64-2.74MM BRN FEP

## (undated) DEVICE — SUTURE VCRL SZ 2-0 L18IN ABSRB UD CT-1 L36MM 1/2 CIR J839D

## (undated) DEVICE — SLEEVE COMPR THGH LEN MED TEAR AWAY GARM SCD EXPRESS [DVT30] [MEDTRONIC COVIDIEN KENDALL]

## (undated) DEVICE — BIPOLAR FORCEPS CORD,BANANA LEADS: Brand: VALLEYLAB

## (undated) DEVICE — ADHESIVE TISS DERMA FLEX 0.7ML -- HIGH VISCOSITY

## (undated) DEVICE — INTENDED FOR TISSUE SEPARATION, AND OTHER PROCEDURES THAT REQUIRE A SHARP SURGICAL BLADE TO PUNCTURE OR CUT.: Brand: BARD-PARKER SAFETY BLADES SIZE 10, STERILE

## (undated) DEVICE — NEEDLE HYPO 22GA L1.5IN BLK POLYPR HUB S STL REG BVL STR

## (undated) DEVICE — NDL PRT INJ NSAF BLNT 18GX1.5 --

## (undated) DEVICE — SUTURE VCRL SZ 0 L18IN ABSRB UD L36MM CT-1 1/2 CIR J840D

## (undated) DEVICE — 20 ML SYRINGE LUER-LOCK TIP: Brand: MONOJECT